# Patient Record
Sex: FEMALE | Race: WHITE
[De-identification: names, ages, dates, MRNs, and addresses within clinical notes are randomized per-mention and may not be internally consistent; named-entity substitution may affect disease eponyms.]

---

## 2021-06-29 ENCOUNTER — HOSPITAL ENCOUNTER (EMERGENCY)
Dept: HOSPITAL 19 - COL.ER | Age: 66
Discharge: HOME | End: 2021-06-29
Attending: FAMILY MEDICINE
Payer: MEDICARE

## 2021-06-29 VITALS — HEIGHT: 62.01 IN | WEIGHT: 217.38 LBS | BODY MASS INDEX: 39.5 KG/M2

## 2021-06-29 VITALS — HEART RATE: 72 BPM | DIASTOLIC BLOOD PRESSURE: 93 MMHG | SYSTOLIC BLOOD PRESSURE: 188 MMHG

## 2021-06-29 VITALS — TEMPERATURE: 98.8 F

## 2021-06-29 DIAGNOSIS — Z95.9: ICD-10-CM

## 2021-06-29 DIAGNOSIS — Z79.899: ICD-10-CM

## 2021-06-29 DIAGNOSIS — Z79.82: ICD-10-CM

## 2021-06-29 DIAGNOSIS — I10: ICD-10-CM

## 2021-06-29 DIAGNOSIS — I25.10: ICD-10-CM

## 2021-06-29 DIAGNOSIS — R06.02: Primary | ICD-10-CM

## 2021-06-29 LAB
ALBUMIN SERPL-MCNC: 4.3 GM/DL (ref 3.5–5)
ALP SERPL-CCNC: 46 U/L (ref 50–136)
ALT SERPL-CCNC: 47 U/L (ref 4–34)
ANION GAP SERPL CALC-SCNC: 7 MMOL/L (ref 7–16)
APTT PPP: 21.5 SECONDS (ref 26–37)
AST SERPL-CCNC: 49 U/L (ref 15–37)
BASOPHILS # BLD: 0 10*3/UL (ref 0–0.2)
BASOPHILS NFR BLD AUTO: 0.4 % (ref 0–2)
BILIRUB SERPL-MCNC: 0.5 MG/DL (ref 0–1)
BUN SERPL-MCNC: 8 MG/DL (ref 7–17)
CALCIUM SERPL-MCNC: 9.9 MG/DL (ref 8.4–10.2)
CHLORIDE SERPL-SCNC: 101 MMOL/L (ref 98–107)
CO2 SERPL-SCNC: 28 MMOL/L (ref 22–30)
CREAT SERPL-SCNC: 0.5 UMOL/L (ref 0.52–1.25)
EOSINOPHIL # BLD: 0.1 10*3/UL (ref 0–0.7)
EOSINOPHIL NFR BLD: 2.2 % (ref 0–4)
ERYTHROCYTE [DISTWIDTH] IN BLOOD BY AUTOMATED COUNT: 13.2 % (ref 11.5–14.5)
GLUCOSE SERPL-MCNC: 152 MG/DL (ref 74–106)
GRANULOCYTES # BLD AUTO: 65.4 % (ref 42.2–75.2)
HCT VFR BLD AUTO: 39.7 % (ref 37–47)
HGB BLD-MCNC: 13.1 G/DL (ref 12.5–16)
INR BLD: 1.1 (ref 0.8–3)
LYMPHOCYTES # BLD: 1.4 10*3/UL (ref 1.2–3.4)
LYMPHOCYTES NFR BLD: 24.6 % (ref 20–51)
MCH RBC QN AUTO: 29 PG (ref 27–31)
MCHC RBC AUTO-ENTMCNC: 33 G/DL (ref 33–37)
MCV RBC AUTO: 88 FL (ref 80–100)
MONOCYTES # BLD: 0.4 10*3/UL (ref 0.1–0.6)
MONOCYTES NFR BLD AUTO: 6.9 % (ref 1.7–9.3)
NEUTROPHILS # BLD: 3.6 10*3/UL (ref 1.4–6.5)
PLATELET # BLD AUTO: 221 K/MM3 (ref 130–400)
PMV BLD AUTO: 9.1 FL (ref 7.4–10.4)
POTASSIUM SERPL-SCNC: 4.2 MMOL/L (ref 3.4–5)
PROT SERPL-MCNC: 7.6 GM/DL (ref 6.4–8.2)
PROTHROMBIN TIME: 11.7 SECONDS (ref 9.7–12.8)
RBC # BLD AUTO: 4.53 M/MM3 (ref 4.1–5.3)
SODIUM SERPL-SCNC: 136 MMOL/L (ref 137–145)
TROPONIN I SERPL-MCNC: < 0.012 NG/ML (ref 0–0.04)

## 2021-07-21 ENCOUNTER — HOSPITAL ENCOUNTER (OUTPATIENT)
Dept: HOSPITAL 19 - COL.CAR | Age: 66
Discharge: HOME | End: 2021-07-21
Attending: INTERNAL MEDICINE
Payer: MEDICARE

## 2021-07-21 VITALS — HEART RATE: 68 BPM | DIASTOLIC BLOOD PRESSURE: 74 MMHG | SYSTOLIC BLOOD PRESSURE: 123 MMHG

## 2021-07-21 VITALS — HEART RATE: 75 BPM | SYSTOLIC BLOOD PRESSURE: 111 MMHG | DIASTOLIC BLOOD PRESSURE: 62 MMHG

## 2021-07-21 VITALS — HEART RATE: 75 BPM | DIASTOLIC BLOOD PRESSURE: 70 MMHG | SYSTOLIC BLOOD PRESSURE: 136 MMHG | TEMPERATURE: 97.8 F

## 2021-07-21 VITALS — SYSTOLIC BLOOD PRESSURE: 108 MMHG | DIASTOLIC BLOOD PRESSURE: 68 MMHG | HEART RATE: 77 BPM

## 2021-07-21 VITALS — DIASTOLIC BLOOD PRESSURE: 63 MMHG | SYSTOLIC BLOOD PRESSURE: 121 MMHG | HEART RATE: 69 BPM

## 2021-07-21 VITALS — HEART RATE: 71 BPM | SYSTOLIC BLOOD PRESSURE: 112 MMHG | DIASTOLIC BLOOD PRESSURE: 73 MMHG

## 2021-07-21 VITALS — BODY MASS INDEX: 39.5 KG/M2 | HEIGHT: 62.01 IN | WEIGHT: 217.38 LBS

## 2021-07-21 VITALS — HEART RATE: 72 BPM | DIASTOLIC BLOOD PRESSURE: 80 MMHG | SYSTOLIC BLOOD PRESSURE: 147 MMHG

## 2021-07-21 VITALS — SYSTOLIC BLOOD PRESSURE: 122 MMHG | HEART RATE: 68 BPM | DIASTOLIC BLOOD PRESSURE: 71 MMHG

## 2021-07-21 VITALS — DIASTOLIC BLOOD PRESSURE: 70 MMHG | HEART RATE: 71 BPM | SYSTOLIC BLOOD PRESSURE: 119 MMHG

## 2021-07-21 VITALS — HEART RATE: 73 BPM | SYSTOLIC BLOOD PRESSURE: 120 MMHG | DIASTOLIC BLOOD PRESSURE: 82 MMHG

## 2021-07-21 DIAGNOSIS — Z79.82: ICD-10-CM

## 2021-07-21 DIAGNOSIS — Z79.899: ICD-10-CM

## 2021-07-21 DIAGNOSIS — I10: ICD-10-CM

## 2021-07-21 DIAGNOSIS — Z20.822: ICD-10-CM

## 2021-07-21 DIAGNOSIS — E78.5: ICD-10-CM

## 2021-07-21 DIAGNOSIS — I25.118: Primary | ICD-10-CM

## 2021-07-21 LAB
ANION GAP SERPL CALC-SCNC: 4 MMOL/L (ref 7–16)
APTT PPP: 27.6 SECONDS (ref 26–37)
BUN SERPL-MCNC: 13 MG/DL (ref 7–17)
CALCIUM SERPL-MCNC: 9.7 MG/DL (ref 8.4–10.2)
CHLORIDE SERPL-SCNC: 103 MMOL/L (ref 98–107)
CO2 SERPL-SCNC: 32 MMOL/L (ref 22–30)
CREAT SERPL-SCNC: 0.48 UMOL/L (ref 0.52–1.25)
ERYTHROCYTE [DISTWIDTH] IN BLOOD BY AUTOMATED COUNT: 12.7 % (ref 11.5–14.5)
GLUCOSE SERPL-MCNC: 215 MG/DL (ref 74–106)
HCT VFR BLD AUTO: 40.4 % (ref 37–47)
HGB BLD-MCNC: 13.4 G/DL (ref 12.5–16)
INR BLD: 1 (ref 0.8–3)
MCH RBC QN AUTO: 29 PG (ref 27–31)
MCHC RBC AUTO-ENTMCNC: 33 G/DL (ref 33–37)
MCV RBC AUTO: 86 FL (ref 80–100)
PLATELET # BLD AUTO: 227 K/MM3 (ref 130–400)
PMV BLD AUTO: 9.4 FL (ref 7.4–10.4)
POTASSIUM SERPL-SCNC: 4.1 MMOL/L (ref 3.4–5)
PROTHROMBIN TIME: 11.5 SECONDS (ref 9.7–12.8)
RBC # BLD AUTO: 4.68 M/MM3 (ref 4.1–5.3)
SODIUM SERPL-SCNC: 138 MMOL/L (ref 137–145)

## 2021-07-21 NOTE — NUR
Pt is back from cath lab.  bs report from Yen VALLEJO.  pt is awake and alert,
pwd, TR band in place to rt wrist, cms intact distal.  poc reviewed, lunch
ordered call light in reach.  friend at bs.

## 2021-07-21 NOTE — NUR
PT RECOVERED WITH NO PROBLEM. TR BAND HAS BEEN DEFLATED, SITE DRESSED WITH
BANDAID, FOLDED 2X2 AND COBAN. CMS INTACT DISTAL.  WE HAVE REVIEWED DC/FU
INSTRUCTIONS.  NO QUESTIONS AT TIME OF DEPARTURE.  IV DC'D WITH CATH INTACT,
DRESSING APPLIED.  PT HAS BEEN AMB WITH STEADY GAIT.  TO EXIT VIA WHEELCHAIR.

## 2021-07-21 NOTE — NUR
SEE MERGE DOCUMENTATION FOR MEDICATION ADMINISTRATION AND INTRA/POST PROCEDURE
SEDATION ASSESSMENTS.

## 2022-08-07 ENCOUNTER — HOSPITAL ENCOUNTER (INPATIENT)
Dept: HOSPITAL 19 - COL.ER | Age: 67
LOS: 4 days | Discharge: NURSING, REHAB OR LONG-TERM CARE | DRG: 494 | End: 2022-08-11
Attending: INTERNAL MEDICINE | Admitting: INTERNAL MEDICINE
Payer: MEDICARE

## 2022-08-07 VITALS — BODY MASS INDEX: 38.62 KG/M2 | HEIGHT: 62.01 IN | WEIGHT: 212.53 LBS

## 2022-08-07 DIAGNOSIS — Z79.4: ICD-10-CM

## 2022-08-07 DIAGNOSIS — M85.80: ICD-10-CM
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
## 2022-08-07 DIAGNOSIS — Z88.5: ICD-10-CM

## 2022-08-07 DIAGNOSIS — I10: ICD-10-CM
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
## 2022-08-07 DIAGNOSIS — Z85.44: ICD-10-CM

## 2022-08-07 DIAGNOSIS — S82.402A: Primary | ICD-10-CM
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
## 2022-08-07 DIAGNOSIS — Z90.710: ICD-10-CM

## 2022-08-07 DIAGNOSIS — S82.401A: ICD-10-CM

## 2022-08-07 DIAGNOSIS — Z88.8: ICD-10-CM

## 2022-08-07 DIAGNOSIS — Z96.651: ICD-10-CM

## 2022-08-07 DIAGNOSIS — F32.A: ICD-10-CM

## 2022-08-07 DIAGNOSIS — R53.81: ICD-10-CM

## 2022-08-07 DIAGNOSIS — I25.10: ICD-10-CM

## 2022-08-07 DIAGNOSIS — Z79.82: ICD-10-CM

## 2022-08-07 DIAGNOSIS — E11.9: ICD-10-CM

## 2022-08-07 DIAGNOSIS — K21.9: ICD-10-CM

## 2022-08-07 DIAGNOSIS — Z95.5: ICD-10-CM

## 2022-08-07 DIAGNOSIS — Z72.89: ICD-10-CM

## 2022-08-07 DIAGNOSIS — E78.5: ICD-10-CM

## 2022-08-07 DIAGNOSIS — Z88.0: ICD-10-CM

## 2022-08-07 PROCEDURE — L4386 NON-PNEUM WALK BOOT PRE CST: HCPCS

## 2022-08-07 PROCEDURE — C1713 ANCHOR/SCREW BN/BN,TIS/BN: HCPCS

## 2022-08-08 VITALS — SYSTOLIC BLOOD PRESSURE: 119 MMHG | TEMPERATURE: 98.2 F | HEART RATE: 75 BPM | DIASTOLIC BLOOD PRESSURE: 57 MMHG

## 2022-08-08 VITALS — HEART RATE: 80 BPM | DIASTOLIC BLOOD PRESSURE: 75 MMHG | SYSTOLIC BLOOD PRESSURE: 135 MMHG | TEMPERATURE: 98.5 F

## 2022-08-08 VITALS — TEMPERATURE: 97.9 F | DIASTOLIC BLOOD PRESSURE: 74 MMHG | HEART RATE: 80 BPM | SYSTOLIC BLOOD PRESSURE: 147 MMHG

## 2022-08-08 VITALS — TEMPERATURE: 98.7 F | HEART RATE: 89 BPM | DIASTOLIC BLOOD PRESSURE: 61 MMHG | SYSTOLIC BLOOD PRESSURE: 129 MMHG

## 2022-08-08 VITALS — HEART RATE: 71 BPM | TEMPERATURE: 98.4 F | DIASTOLIC BLOOD PRESSURE: 53 MMHG | SYSTOLIC BLOOD PRESSURE: 126 MMHG

## 2022-08-08 VITALS — DIASTOLIC BLOOD PRESSURE: 55 MMHG | HEART RATE: 70 BPM | TEMPERATURE: 98.2 F | SYSTOLIC BLOOD PRESSURE: 134 MMHG

## 2022-08-08 LAB
ALBUMIN SERPL-MCNC: 4 GM/DL (ref 3.4–4.8)
ALP SERPL-CCNC: 39 U/L (ref 40–150)
ALT SERPL-CCNC: 37 U/L (ref 0–55)
ANION GAP SERPL CALC-SCNC: 16 MMOL/L (ref 7–16)
AST SERPL-CCNC: 30 U/L (ref 5–34)
BASOPHILS # BLD: 0.1 K/MM3 (ref 0–0.2)
BASOPHILS NFR BLD AUTO: 0.9 % (ref 0–2)
BILIRUB SERPL-MCNC: 0.5 MG/DL (ref 0.2–1.2)
BUN SERPL-MCNC: 7 MG/DL (ref 10–20)
CALCIUM SERPL-MCNC: 9.6 MG/DL (ref 8.4–10.2)
CHLORIDE SERPL-SCNC: 94 MMOL/L (ref 98–107)
CO2 SERPL-SCNC: 22 MMOL/L (ref 23–31)
CREAT SERPL-SCNC: 0.6 MG/DL (ref 0.57–1.11)
EOSINOPHIL # BLD: 0.2 K/MM3 (ref 0–0.7)
EOSINOPHIL NFR BLD: 2.8 % (ref 0–4)
ERYTHROCYTE [DISTWIDTH] IN BLOOD BY AUTOMATED COUNT: 12 % (ref 11.5–14.5)
ETHANOL SPEC-SCNC: 165 MG/DL (ref 0–10)
GLUCOSE SERPL-MCNC: 153 MG/DL (ref 70–99)
GRANULOCYTES # BLD AUTO: 61.7 % (ref 42.2–75.2)
HCT VFR BLD AUTO: 36.4 % (ref 37–47)
HGB BLD-MCNC: 12.8 G/DL (ref 12.5–16)
LYMPHOCYTES # BLD: 1.9 K/MM3 (ref 1.2–3.4)
LYMPHOCYTES NFR BLD: 27.9 % (ref 20–51)
MAGNESIUM SERPL-MCNC: 1.5 MG/DL (ref 1.6–2.6)
MCH RBC QN AUTO: 29 PG (ref 27–31)
MCHC RBC AUTO-ENTMCNC: 35 G/DL (ref 33–37)
MCV RBC AUTO: 84 FL (ref 80–100)
MONOCYTES # BLD: 0.4 K/MM3 (ref 0.1–0.6)
MONOCYTES NFR BLD AUTO: 6.1 % (ref 1.7–9.3)
NEUTROPHILS # BLD: 4.2 K/MM3 (ref 1.4–6.5)
PH UR STRIP.AUTO: 5 [PH] (ref 5–8)
PLATELET # BLD AUTO: 219 K/MM3 (ref 130–400)
PMV BLD AUTO: 9.1 FL (ref 7.4–10.4)
POTASSIUM SERPL-SCNC: 3.8 MMOL/L (ref 3.5–4.5)
PROT SERPL-MCNC: 7.4 GM/DL (ref 6.2–8.1)
RBC # BLD AUTO: 4.35 M/MM3 (ref 4.1–5.3)
RBC # UR: (no result) /HPF (ref 0–2)
SODIUM SERPL-SCNC: 132 MMOL/L (ref 136–145)
SP GR UR STRIP.AUTO: 1 (ref 1–1.03)
SQUAMOUS # URNS: (no result) /HPF (ref 0–10)
URN COLLECT METHOD CLASS: (no result)

## 2022-08-08 PROCEDURE — 2W3LX1Z IMMOBILIZATION OF RIGHT LOWER EXTREMITY USING SPLINT: ICD-10-PCS | Performed by: EMERGENCY MEDICINE

## 2022-08-08 PROCEDURE — 2W3MX1Z IMMOBILIZATION OF LEFT LOWER EXTREMITY USING SPLINT: ICD-10-PCS | Performed by: EMERGENCY MEDICINE

## 2022-08-08 NOTE — NUR
Initial visit; Patient has broken both ankles and talked about her "bad luck!"
She and  prayed and  offered comfort and will keep her in her
prayers.

## 2022-08-08 NOTE — NUR
PT A&O RESTING IN BED. PT REPORTING PAIN 4/10 IN BLE. NORCO GIVEN. NO INSULIN
NEEDED PER SS. ASSESSMENT COMPLETED. SPLINTS APPLIED TO BLE. CAMPOS IN PLACE.
IV WITH NS @75 IN RT HAND. NO OTHER NEEDS AT THIS TIME. CALL LIGHT WITHIN
REACH.

## 2022-08-08 NOTE — NUR
Pt has had an uneventful night. Pain has been managed with x1 dose of morphine
and x1 dose of norco. The patient does have considerable pain when she moves.
Per orders, the patient is NWB until after PT sees her. No other concerns.
Will report to Day shift RN.

## 2022-08-08 NOTE — NUR
BRENDA met with the patient to discuss to discuss discharge plan. The patient
lives alone in an apartment in [place]. She states that she lives on the
first floor and they are handicap accessible apartments. She reports
indepedence with ADLs and has a cane. The patient's PCP is Dr. Jim Mix
in  and she receives her medications from [facility]. The patient does not
have a DPOA-HC, but she was interested in obtaining a form. BRENDA provided. The
patient states that she is still  to her , Nila, but he is in
care home and does not want him involved. She has two daughters, but does not
talk to them. She provided SW with her sister's contact information: Ale
(ph#[phone]). She lives in [place]. SW informed her that her  would
be her legal next of kin, until the DPOA-HC is completed. The patient
verbalized understanding and states that she plans on getting the DPOA-HC done
while here. The patient has bilateral ankle fractures and is non-weight
bearing. BRENDA discussed post-acute rehab upon discharge. The patient is
interested in rehab. SW provided her with Medicare.gov's list of SNFs in the
[place] area and informed her or IPR. The patient chose IPR. She does
not have a second preference and is open to SW sending referrals to other
local facilities and [facility]. BRENDA consulted [facility] Director, Aarti. BRENDA
contacted and faxed a referral to [facility], [facility], and [facility]. Awaiting screens.
 
The patient provides that she has also been depressed with why her  is
in care home and that he will be getting out soon. She was interested in a list
of mental health resources. SW provided her with that list. The patient denies
suicideal ideation.
 
*Discharge plan: post-acute rehab*

## 2022-08-09 VITALS — DIASTOLIC BLOOD PRESSURE: 64 MMHG | SYSTOLIC BLOOD PRESSURE: 140 MMHG | HEART RATE: 73 BPM

## 2022-08-09 VITALS — DIASTOLIC BLOOD PRESSURE: 59 MMHG | SYSTOLIC BLOOD PRESSURE: 123 MMHG | HEART RATE: 72 BPM

## 2022-08-09 VITALS — HEART RATE: 71 BPM | TEMPERATURE: 98.3 F | DIASTOLIC BLOOD PRESSURE: 60 MMHG | SYSTOLIC BLOOD PRESSURE: 123 MMHG

## 2022-08-09 VITALS — SYSTOLIC BLOOD PRESSURE: 123 MMHG | TEMPERATURE: 97.7 F | DIASTOLIC BLOOD PRESSURE: 82 MMHG | HEART RATE: 69 BPM

## 2022-08-09 VITALS — DIASTOLIC BLOOD PRESSURE: 54 MMHG | HEART RATE: 74 BPM | SYSTOLIC BLOOD PRESSURE: 127 MMHG | TEMPERATURE: 98.3 F

## 2022-08-09 VITALS — HEART RATE: 62 BPM | TEMPERATURE: 97.6 F | SYSTOLIC BLOOD PRESSURE: 109 MMHG | DIASTOLIC BLOOD PRESSURE: 58 MMHG

## 2022-08-09 VITALS — SYSTOLIC BLOOD PRESSURE: 139 MMHG | DIASTOLIC BLOOD PRESSURE: 69 MMHG | HEART RATE: 72 BPM

## 2022-08-09 VITALS — HEART RATE: 72 BPM | DIASTOLIC BLOOD PRESSURE: 73 MMHG | SYSTOLIC BLOOD PRESSURE: 124 MMHG

## 2022-08-09 VITALS — SYSTOLIC BLOOD PRESSURE: 124 MMHG | HEART RATE: 68 BPM | DIASTOLIC BLOOD PRESSURE: 88 MMHG | TEMPERATURE: 98 F

## 2022-08-09 VITALS — HEART RATE: 66 BPM | DIASTOLIC BLOOD PRESSURE: 75 MMHG | SYSTOLIC BLOOD PRESSURE: 158 MMHG

## 2022-08-09 VITALS — SYSTOLIC BLOOD PRESSURE: 136 MMHG | DIASTOLIC BLOOD PRESSURE: 60 MMHG | HEART RATE: 70 BPM

## 2022-08-09 VITALS — HEART RATE: 78 BPM | SYSTOLIC BLOOD PRESSURE: 129 MMHG | DIASTOLIC BLOOD PRESSURE: 54 MMHG

## 2022-08-09 VITALS — TEMPERATURE: 97.7 F | DIASTOLIC BLOOD PRESSURE: 48 MMHG | HEART RATE: 67 BPM | SYSTOLIC BLOOD PRESSURE: 142 MMHG

## 2022-08-09 LAB
ANION GAP SERPL CALC-SCNC: 12 MMOL/L (ref 7–16)
BASOPHILS # BLD: 0 K/MM3 (ref 0–0.2)
BASOPHILS NFR BLD AUTO: 0.6 % (ref 0–2)
BUN SERPL-MCNC: 7 MG/DL (ref 10–20)
CALCIUM SERPL-MCNC: 9.5 MG/DL (ref 8.4–10.2)
CHLORIDE SERPL-SCNC: 101 MMOL/L (ref 98–107)
CO2 SERPL-SCNC: 27 MMOL/L (ref 23–31)
CREAT SERPL-SCNC: 0.63 MG/DL (ref 0.57–1.11)
EOSINOPHIL # BLD: 0.2 K/MM3 (ref 0–0.7)
EOSINOPHIL NFR BLD: 3.9 % (ref 0–4)
ERYTHROCYTE [DISTWIDTH] IN BLOOD BY AUTOMATED COUNT: 12.3 % (ref 11.5–14.5)
GLUCOSE SERPL-MCNC: 142 MG/DL (ref 70–99)
GRANULOCYTES # BLD AUTO: 55.9 % (ref 42.2–75.2)
HCT VFR BLD AUTO: 37.8 % (ref 37–47)
HGB BLD-MCNC: 12.6 G/DL (ref 12.5–16)
LYMPHOCYTES # BLD: 1.5 K/MM3 (ref 1.2–3.4)
LYMPHOCYTES NFR BLD: 31 % (ref 20–51)
MCH RBC QN AUTO: 29 PG (ref 27–31)
MCHC RBC AUTO-ENTMCNC: 33 G/DL (ref 33–37)
MCV RBC AUTO: 87 FL (ref 80–100)
MONOCYTES # BLD: 0.4 K/MM3 (ref 0.1–0.6)
MONOCYTES NFR BLD AUTO: 7.8 % (ref 1.7–9.3)
NEUTROPHILS # BLD: 2.7 K/MM3 (ref 1.4–6.5)
PLATELET # BLD AUTO: 215 K/MM3 (ref 130–400)
PMV BLD AUTO: 9.7 FL (ref 7.4–10.4)
POTASSIUM SERPL-SCNC: 4 MMOL/L (ref 3.5–4.5)
RBC # BLD AUTO: 4.36 M/MM3 (ref 4.1–5.3)
SODIUM SERPL-SCNC: 140 MMOL/L (ref 136–145)

## 2022-08-09 PROCEDURE — 0QSK04Z REPOSITION LEFT FIBULA WITH INTERNAL FIXATION DEVICE, OPEN APPROACH: ICD-10-PCS | Performed by: ORTHOPAEDIC SURGERY

## 2022-08-09 NOTE — NUR
PATIENT IS A&O. VSS. C/O PAIN IN BLE RATED AT 6/10, GAVE PRN NORCO PER ORDERS.
BLE ELEVATED WITH PILLOWS. SPLINT/CAST DRESSING IS CD&I. NWB TO BLE. PT/OT
CONSULTED. AWAITING ORTHO TO ROUND. NO C/O N/V. BREAKFAST TRAY AT BEDSIDE.
AM BS 'S, NO SSI REQUIRED. AM MEDS GIVEN. RIGHT HAND IV TO INT. CAMPOS
TO DD WITH MOD AMOUNTS OF CLEAR YELLOW URINE NOTED. HEAD TO TOE ASSESSMENT
COMPLETE. PATIENT LIVES ALONE AS HER  IS IN [facility] AS REPORTED BY HER.
PATIENT WILL LIKELY NEED PLACEMENT UPON DISCHARGE.

## 2022-08-09 NOTE — NUR
Follow-up; Maribell enjoying sitting up this morning and visited and 'joked
around' with . Patient thanked  for coming in, making her day
lighter and offering God's blessings.

## 2022-08-09 NOTE — NUR
Ortho was consulted. Ortho plans to take the patient to surgery today. Aarti,
[facility] Director, reports that they are following the patient. SW to fax updates
to [facility], [facility], and [facility].

## 2022-08-09 NOTE — NUR
PATIENT GOING DOWN TO OR EARLY FOR A BLOCK. CONSENT ON CHART. IV FLUIDS
INFUSING VIA GRAVITY. PATIENT OFF FLOOR.

## 2022-08-09 NOTE — NUR
PT A&O RESTING IN BED WATCHING TV. PT TOLERATED DINNER. ASSESSMENT COMPLETED.
PT RATES PN A 0/10. CAM WALKERS APPLIED TO BLE. IV FLUIDS TO LH. CAMPOS IN
PLACE. NO OTHER NEEDS AT THIS TIME. CALL LIGHT WITHIN REACH.

## 2022-08-09 NOTE — NUR
PATIENT BACK IN ROOM POST OP. A&O. NO C/O PAIN. UNABLE TO MOVE BLE DUE TO
BLOCK. VSS. HEAD TO TOE ASSESSMENT WNL. PATIENT ASKING ABOUT EATTING AND IS
NOW LOOKING AT MENU. BS IN PACU . ADA DIET.

## 2022-08-09 NOTE — NUR
ATTEMPTED TO CALL ORTHO CONSULT AS PATIENT WAS NEARLY DISCHARGED FROM ER WITH
AN ORTHO FLEX APT @ 1000 ACCORDING TO ER DOCUMENTATION. SINCE ADMISSION TO
FLOOR, NO ORTHO NOTIFICATION FOR CONSULTED COMPLETED. NEITHER ORTHO GROUP WAS
AWARE OF PATIENT IN ER OR ADMISSION STATUS.  WILL CONSULT FOR THIS
PATIENT, SEE ORDERS.

## 2022-08-09 NOTE — NUR
ORTHO MIDLEVEL ROUNDING, PATIENT WILL REQUIRE SURGICAL INTERVENTION. PATIENT
ALREADY AT BREAKFAST AND IS NOW NPO. PT HAD ALSO BE CONSULTED ON ADMISSION AND
GOT PATIENT UP TO WC WITH SLIDE BOARD, PER ORDERS, WITH NWB STATUS TO BLE.
PATIENT IS NOW BACK IN BED ON BEDREST TILL SURGERY. SEE ORDERS FOR CONSENT.
ANESTHESIA AWARE. PLAN IS FOR OR AROUND 1600.

## 2022-08-10 VITALS — SYSTOLIC BLOOD PRESSURE: 146 MMHG | DIASTOLIC BLOOD PRESSURE: 71 MMHG | HEART RATE: 79 BPM | TEMPERATURE: 98.1 F

## 2022-08-10 VITALS — TEMPERATURE: 98.4 F | SYSTOLIC BLOOD PRESSURE: 148 MMHG | DIASTOLIC BLOOD PRESSURE: 64 MMHG | HEART RATE: 72 BPM

## 2022-08-10 VITALS — TEMPERATURE: 97.8 F | SYSTOLIC BLOOD PRESSURE: 148 MMHG | DIASTOLIC BLOOD PRESSURE: 59 MMHG | HEART RATE: 81 BPM

## 2022-08-10 VITALS — TEMPERATURE: 98.6 F | DIASTOLIC BLOOD PRESSURE: 89 MMHG | HEART RATE: 79 BPM | SYSTOLIC BLOOD PRESSURE: 131 MMHG

## 2022-08-10 VITALS — DIASTOLIC BLOOD PRESSURE: 84 MMHG | TEMPERATURE: 98.7 F | SYSTOLIC BLOOD PRESSURE: 162 MMHG

## 2022-08-10 VITALS — DIASTOLIC BLOOD PRESSURE: 64 MMHG | TEMPERATURE: 97.4 F | SYSTOLIC BLOOD PRESSURE: 153 MMHG | HEART RATE: 70 BPM

## 2022-08-10 LAB
ANION GAP SERPL CALC-SCNC: 11 MMOL/L (ref 7–16)
BASOPHILS # BLD: 0 K/MM3 (ref 0–0.2)
BASOPHILS NFR BLD AUTO: 0.5 % (ref 0–2)
BUN SERPL-MCNC: 10 MG/DL (ref 10–20)
CALCIUM SERPL-MCNC: 9.6 MG/DL (ref 8.4–10.2)
CHLORIDE SERPL-SCNC: 98 MMOL/L (ref 98–107)
CO2 SERPL-SCNC: 28 MMOL/L (ref 23–31)
CREAT SERPL-SCNC: 0.65 MG/DL (ref 0.57–1.11)
EOSINOPHIL # BLD: 0.2 K/MM3 (ref 0–0.7)
EOSINOPHIL NFR BLD: 4.2 % (ref 0–4)
ERYTHROCYTE [DISTWIDTH] IN BLOOD BY AUTOMATED COUNT: 12.5 % (ref 11.5–14.5)
GLUCOSE SERPL-MCNC: 128 MG/DL (ref 70–99)
GRANULOCYTES # BLD AUTO: 62.6 % (ref 42.2–75.2)
HCT VFR BLD AUTO: 36.2 % (ref 37–47)
HGB BLD-MCNC: 11.6 G/DL (ref 12.5–16)
LYMPHOCYTES # BLD: 1.4 K/MM3 (ref 1.2–3.4)
LYMPHOCYTES NFR BLD: 24.7 % (ref 20–51)
MCH RBC QN AUTO: 29 PG (ref 27–31)
MCHC RBC AUTO-ENTMCNC: 32 G/DL (ref 33–37)
MCV RBC AUTO: 90 FL (ref 80–100)
MONOCYTES # BLD: 0.4 K/MM3 (ref 0.1–0.6)
MONOCYTES NFR BLD AUTO: 7.5 % (ref 1.7–9.3)
NEUTROPHILS # BLD: 3.6 K/MM3 (ref 1.4–6.5)
PLATELET # BLD AUTO: 197 K/MM3 (ref 130–400)
PMV BLD AUTO: 9.5 FL (ref 7.4–10.4)
POTASSIUM SERPL-SCNC: 3.6 MMOL/L (ref 3.5–4.5)
RBC # BLD AUTO: 4.03 M/MM3 (ref 4.1–5.3)
SODIUM SERPL-SCNC: 137 MMOL/L (ref 136–145)

## 2022-08-10 NOTE — NUR
PATIENT UP TO BEDSIDE COMMODE WITH PIVOTE TRANSFER WITH PT. PATIENT DID HAVE
BM. PATIENT THEN TRANSFERED INTO BEDSIDE CHAIR. PATIENT BECAME TEARFUL AT
DIFFICULTY IN JUST PIVOTING BUT REPORTS NO ISSUES WITH PAIN. PATIENT ALSO
EXPRESSES DIFFICULTY WITH MAINTAINING TTWB TO LLE. CAM BOOTS TO BLE. SEE PT
NOTES.

## 2022-08-10 NOTE — NUR
PATIENT IS A&O. VSS. DENIES PAIN IN BLE SINCE GETTING BLOCK YESTERDAY IN OR.
PAIN WELL MANAGED. CAM BOOTS INPLACE TO BLE. TTWB TO LLE. WBAT TO RLE. PT/OT
CONSULTED. PATIENT REPORTS SHE WILL NEED PLACEMENT AS SHE CURRENTLY LIVES
ALONE BECAUSE HER  IS IN intermediate. NO C/O N/V. LEFT FORARM IV TO INT. AM
BS . BREAKFAST TRAY AT BEDSIDE. AM MEDS GIVEN. HEAD TO TOE ASSESSMENT
COMPLETE. CAMPOS TO DD WITH MOD AMOUNTS OF URINE NOTED. NO OTHER NEEDS AT THIS
TIME. CALL LIGHT IN REACH.

## 2022-08-10 NOTE — NUR
PT A&OX4 RESTING IN BED. ASSESSMENT COMPLETED. NO INSULIN REQUIRED PER SS. PT
RATES PN IN RLE A 4/10 AND NO PN IN THE LLE. CAM BOOTS TO BOTH EXTREMITIES. LH
INT PATENT. NO OTHER NEEDS AT THIS TIME. CALL LIGHT WITHIN REACH.

## 2022-08-11 ENCOUNTER — HOSPITAL ENCOUNTER (INPATIENT)
Dept: HOSPITAL 19 - IPR | Age: 67
LOS: 8 days | Discharge: HOME HEALTH | DRG: 561 | End: 2022-08-19
Attending: PHYSICAL MEDICINE & REHABILITATION | Admitting: PHYSICAL MEDICINE & REHABILITATION
Payer: MEDICARE

## 2022-08-11 VITALS — TEMPERATURE: 98.3 F | HEART RATE: 71 BPM | DIASTOLIC BLOOD PRESSURE: 54 MMHG | SYSTOLIC BLOOD PRESSURE: 116 MMHG

## 2022-08-11 VITALS — DIASTOLIC BLOOD PRESSURE: 64 MMHG | TEMPERATURE: 98 F | SYSTOLIC BLOOD PRESSURE: 148 MMHG | HEART RATE: 78 BPM

## 2022-08-11 VITALS — HEIGHT: 62.01 IN | BODY MASS INDEX: 51.99 KG/M2 | WEIGHT: 286.16 LBS

## 2022-08-11 VITALS — DIASTOLIC BLOOD PRESSURE: 76 MMHG | SYSTOLIC BLOOD PRESSURE: 167 MMHG | TEMPERATURE: 98.4 F | HEART RATE: 78 BPM

## 2022-08-11 VITALS — DIASTOLIC BLOOD PRESSURE: 55 MMHG | TEMPERATURE: 98 F | HEART RATE: 73 BPM | SYSTOLIC BLOOD PRESSURE: 126 MMHG

## 2022-08-11 VITALS — TEMPERATURE: 97.9 F | SYSTOLIC BLOOD PRESSURE: 135 MMHG | HEART RATE: 71 BPM | DIASTOLIC BLOOD PRESSURE: 61 MMHG

## 2022-08-11 DIAGNOSIS — Z88.5: ICD-10-CM

## 2022-08-11 DIAGNOSIS — R26.89: ICD-10-CM

## 2022-08-11 DIAGNOSIS — Z95.5: ICD-10-CM

## 2022-08-11 DIAGNOSIS — F10.20: ICD-10-CM

## 2022-08-11 DIAGNOSIS — Z79.891: ICD-10-CM

## 2022-08-11 DIAGNOSIS — S89.301D: ICD-10-CM

## 2022-08-11 DIAGNOSIS — Z73.6: ICD-10-CM

## 2022-08-11 DIAGNOSIS — K21.9: ICD-10-CM

## 2022-08-11 DIAGNOSIS — Z96.651: ICD-10-CM

## 2022-08-11 DIAGNOSIS — Z79.82: ICD-10-CM

## 2022-08-11 DIAGNOSIS — S82.62XD: Primary | ICD-10-CM

## 2022-08-11 DIAGNOSIS — Z88.8: ICD-10-CM

## 2022-08-11 DIAGNOSIS — E78.5: ICD-10-CM

## 2022-08-11 DIAGNOSIS — F32.A: ICD-10-CM

## 2022-08-11 DIAGNOSIS — Z88.0: ICD-10-CM

## 2022-08-11 DIAGNOSIS — I10: ICD-10-CM

## 2022-08-11 DIAGNOSIS — Z79.899: ICD-10-CM

## 2022-08-11 DIAGNOSIS — I25.10: ICD-10-CM
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
## 2022-08-11 NOTE — NUR
ASSISTED PIVOT TO BSC PER LT TTWB/ WALKER. VOIDED W/O DIFFICULTY. PT C/O
SEVERE BILAT ANKLE PAIN.  SEE MAR FOR NORCO GIVEN. INT TO LT HAND DC'D. READY
FOR BED. CALL LIGHT IN REACH. BED ALARM SET.

## 2022-08-11 NOTE — NUR
CALLED DR UGO LOUIS. SHE STATED FROM [facility] STANDPOINT PATIENT IS TO FOLLOW UP
WITH THEM IN 1 WEEK. WBAT TO R LEG, AND TTWB TO LEFT LEG. CAMBOOTS TO STAY ON
UNLESS SHORT PERIOD OF TIME FOR ASSESSEMENT. TRHEY ARE COMFORTABLE WITH
PATIENT GOING TO IPR IF REQUESTED. AND WILL FOLLOW UP AGAIN IF NEEDED. WILL
INFORM HOSPITALIST GROUP.

## 2022-08-11 NOTE — NUR
ASSESSMENT DONE AS ORDER. PATIENT ARRIVED  PM IN ROOM 340. PATIENT ALERT
AND ORIENT X 4 WITH NO  MEMORY ISSUE AT THIS TIME. PAIN IS MILD AT THIS
TIME 4/10 ON PAIN SCALE. LUNG SOUND CLERA IN ALL LOBES. BOWEL SOUND ACTIVE IN
ALL 4 QUADS. BOWEL MOVEMENT DURING ROOM CHANGE (LOOSE STOOL) PATIENT ABLE TO
STAND AND PELIV TO COMMOND AND BACK TO BED. LEFT FOOT SURGERY. RIGHT FOOT NO
FRACTURE NOTED. BOTH OF THEM HAVE A BOOT ON. LEFT FOOT HAS STABLES AND IS WRAP
UP WITH KEROFORM AND GAUZE WITH ACE WRAP. CALL LIGHT  IN REACH, BED ALARM ON
AS ORDER.

## 2022-08-11 NOTE — NUR
Aarti, [facility] Director, reports that they are able to accept the patient today.
The patient is to discharge today, 8/11, to [facility]. No
additional needs at this time.

## 2022-08-12 VITALS — HEART RATE: 70 BPM | SYSTOLIC BLOOD PRESSURE: 146 MMHG | DIASTOLIC BLOOD PRESSURE: 65 MMHG | TEMPERATURE: 98.3 F

## 2022-08-12 VITALS — TEMPERATURE: 97.7 F | HEART RATE: 67 BPM | DIASTOLIC BLOOD PRESSURE: 56 MMHG | SYSTOLIC BLOOD PRESSURE: 139 MMHG

## 2022-08-12 NOTE — NUR
JANICE GUTIERREZ CAME BY AND SPOKE WITH PATIENT REGARDING DOPA. PAPERWORK WAS SIGN
BY PATIENT,NURSE AND SW. PATIENT REQUESTING FOR HER FRIEND TO BE THE DPOA.
PATIENT HAD PAIN DUE TO WORKING ALOT ON HER RIGHT FOOT. EXPLAIN TO SLOW DOWN A
LITTLE AND REST INBETWEEN SESSION. PATIENT UNDERSTOOD.

## 2022-08-12 NOTE — NUR
ASSESSMENT DONE AS ORDER. PATIENT ALERT X 4 WITH NO MEMORY ISSUE. PATIENT CAME
FROM SURGICAL UNIT YESTERDAY. PATIENT WAS PIVOT TURN TRANSFER DUE TO FRACTURE
ON LEFT FOOT AND BRUISE ON RIGHT FOOT.BILAT CAM BOOTS ON AS ORDER. GORGECO WAS
GIVEN THIS AM FOR PAIN DUE TO MOVING AROUND. LUNG SOUND CLEAR IN ALL LOBES.
BOWEL SOUND ACTIVE IN ALL 4 QUADS. PATIENT SLIGHTLY WEAK AND OT IS WORKING ON
UPPER ARM. PT WORKING ON LOWER PART OF BODY AND TRYING TO GET HER RIGHT LEG
STRONGER. SPOKE WITH DR VILLANUEVA ABOUT LIPITOR AND SSI. HE SAID HE WILL
DISCONTINUE LIPITOR AND D/C ACCUCHECK DUE TO PATIENT NOT TAKING ANY MEDICATION
AND CONTROLLY IT WITH DIET AND EXCERISE. CALL LIGHT IN REACH.

## 2022-08-12 NOTE — NUR
met with patient to complete initial intake as she is new to
[facility]. Patient lives alone in [place] with her cat, Meryl in a one level
apartment. Patient currently sees Dr. Jim Mix for primary care, but
plans to change this at some point.  discussed DPOA-HC wiht
patient as her legal next of kin, her  is in [facility] and patient also
does not have good communication with her children. Patient would like to
complete DPOA-HC form with SW and she stated she wanted to desigate her
friend, Estrella (ph#[phone]) as primary agent and sister, Ale
(ph#[phone]). SW assisted patient in completing form then SW and RN
provided witness signature. SW placed form in chart and provided copy to
patient. Patient had no questions or concerns at this time.

## 2022-08-13 VITALS — SYSTOLIC BLOOD PRESSURE: 151 MMHG | TEMPERATURE: 98.1 F | HEART RATE: 80 BPM | DIASTOLIC BLOOD PRESSURE: 58 MMHG

## 2022-08-13 VITALS — TEMPERATURE: 97.7 F | HEART RATE: 70 BPM | DIASTOLIC BLOOD PRESSURE: 65 MMHG | SYSTOLIC BLOOD PRESSURE: 148 MMHG

## 2022-08-13 NOTE — NUR
PATIENT REQUESTED/GIVEN PAIN MED, SEE MAR FOR MED GIVEN. UP TO BSC TO VOID AND
BACK TO BED WITH NO ADDITIONAL COMPLAINTS/NEEDS REPORTED AT THIS TIME.

## 2022-08-13 NOTE — NUR
ASSESSMENT DONE AS ORDER. PATIENT ALERT X 4 WITH NO MEMORY ISSUE. PATIENT PAIN
WAS SLIGHTLY HIGH THIS AM AS SHE ASK FOR A PAIN MEDICATION. NORCO WAS GIVEN
AS ORDER. PAIN WAS A 6/10 BEFORE MEDICATION. LUNG SOUND CLEAR IN ALL LOBES.
BOWEL SOUND ACTIVE IN ALL 4 QUADS. LEGS IN BOOT AS ORDER. NO DRAIN NOTED.
PATINET ABLE TO PIVOT AND USE COMMODE.

## 2022-08-13 NOTE — NUR
NORCO AND TYLENOL WAS GIVEN ABOUT 4 HOURS APART. PATIENT STATED HER PAIN IS
4/10 AT THIS TIME BUT AT 11AM, HER PAIN WAS A 2/10. EXPLAIN TO REST AND WE
WILL REASSESS THE PAIN. PATIENT UNDERSTOOD

## 2022-08-13 NOTE — NUR
PATIENT HAD A LARGE LOOSE BOWEL MOVEMENT. PATIENT HAD 4 TYLENOL TODAY AND 1
DOSE OF NORCO FOR PAIN. PAIN HAS BEEN AROUND 2/10-4/10.

## 2022-08-14 VITALS — HEART RATE: 69 BPM | TEMPERATURE: 98 F | DIASTOLIC BLOOD PRESSURE: 59 MMHG | SYSTOLIC BLOOD PRESSURE: 140 MMHG

## 2022-08-14 VITALS — DIASTOLIC BLOOD PRESSURE: 56 MMHG | SYSTOLIC BLOOD PRESSURE: 149 MMHG | HEART RATE: 63 BPM | TEMPERATURE: 97.8 F

## 2022-08-14 NOTE — NUR
PATIENT CURRENTLY RESTING IN BED. SHE HAD A LARGE LOOSE BOWL MOVEMENT. PAIN
MEDICINE GIVEN. CALL LIGHT AND BELNIMARLOGS WITHIN REACH.

## 2022-08-14 NOTE — NUR
ASSISTED PATIENT TO BEDSIDE COMMODE, X1 ASSIST. PATIENT HAD UNMEASURED CLEAR
YELLOW VOID. ASSISTED PATIENT BACK TO BED. PATIENT REQUESTED PAIN MEDICATION.
NORCO GIVEN LAST. DISCUSSED WITH ALEXANDRIA VALLEJO, PRN TYLENOL GIVEN. DENIES
ADDITIONAL NEEDS.

## 2022-08-15 VITALS — DIASTOLIC BLOOD PRESSURE: 59 MMHG | TEMPERATURE: 97.6 F | SYSTOLIC BLOOD PRESSURE: 134 MMHG | HEART RATE: 70 BPM

## 2022-08-15 VITALS — SYSTOLIC BLOOD PRESSURE: 132 MMHG | DIASTOLIC BLOOD PRESSURE: 72 MMHG | TEMPERATURE: 97.4 F | HEART RATE: 67 BPM

## 2022-08-15 NOTE — NUR
PATIENT HAD SHOWER TODAY AND PATIENT INFORM OT THAT SHE HAS BEEN HAVING PAIN
ON RIGHT ANKLE. NURSE ASSESS AND NOTICES THERE IS REDNESS ON ANKLE AND BELOW
AND ON TOP. SWOLLEN WITH EDEMA NOTED(+2 PEDDING EDEMA) PAIN IS A 2/10 AT THIS
TIME WITH OUT THE BOOT. PATIENT INFORM THAT WHEN SHE STAND SHE FEELS THAT THE
BOOT IS RUBBING AGAINS THE ANKLE. I PLACE ICE AROUND AREA TO HELP WITH THE
EDEMA. ELEVATED LEG AT THIS TIME. EXPLAIN THAT WE WILL WRAP AREA WITH ACE WRAP
TODAY TO HELP CUSHION UP BEFORE PLACING THE BOOT BACK ON. PATIENT UNDERSTOOD.

## 2022-08-15 NOTE — NUR
ASSESSMENT DONE AS ORDER. PATIENT ALERT X 4 WITH NO MEMORY ISSUE. PATIENT
ASK FOR A NORCO AT 8:00 THIS MORNING DUE TO PAIN ON RIGHT ANKLE 4/10. PATIENT
STATED THAT HER PAIN HAS INCREASE BUT WITH NORCO ON BOARD IT DECREASE TO A
2/10 ON PAIN SCALE. LUNG SOUND CLEAR IN ALL LOBES. BOWEL SOUND ACTIVE IN ALL 4
QUADS. BOWEL MOVEMENT DAY WITH NO MIRALEX NEEDED. LOOSE STOOL.CALL LIGHT IN
REACH.

## 2022-08-15 NOTE — NUR
WEIGHT WAS DONE TODAY. DURING ADMISSION PATIENT WAS WEIGHT IN BED. TODAY
PATINET WAS WEIGHT ON SCALE WITH WHEELCHAIR. TODAY WEIGHT IS 99.5

## 2022-08-15 NOTE — NUR
PATIENT RESTING IN BED EATHING HIS DINNER AT THIS TIME.  PATIENT REQUESTS
FRESH ICE WATER AT THIS TIME.  PATIENT EXPRESSED APPRECIATION FOR THE FRESH
ICE WATER AND DENIES FURTHER NEEDS OR CONCERNS.  PATIENT IS ENCOURAGED TO CALL
WITH ANY NEEDS OR CONCERNS.  CALL LIGHT IS WITHIN REACH OF PATIENT.

## 2022-08-15 NOTE — NUR
Admission QIM scores were reviewed by the team.  Code of 5 chosen for oral
hygiene was determined by team discussion to be the most usual performance
before interventions for this patient during the assessment period.  Code of 3
chosen for toilet hygiene was determined by team discussion to be the most
usual performance before interventions for this patient during the assessment
period.  Code of 3 chosen for toilet transfer was determined by team
discussion to be the most usual performance before interventions for this
patient during the assessment period.  Code of 6 chosen for lying to sitting
on side of bed was determined by team discussion to be the most usual
performance before interventions for this patient during the assessment
period.   Code of 3 chosen for sit to stand was determined by team discussion
to be the most usual performance before interventions for this patient during
the assessment period.  Code of 3 chosen for chair/bed to chair transfer was
determined by team discussion to be the most usual performance before
interventions for this patient during the assessment period.--Aarti King, PD

## 2022-08-15 NOTE — NUR
checked in with patient who had questions about DME and Home
Health. SW advised that the therapy team gets together on Wednesdays and SW
will follow up with recommendations.

## 2022-08-16 VITALS — SYSTOLIC BLOOD PRESSURE: 130 MMHG | DIASTOLIC BLOOD PRESSURE: 64 MMHG | TEMPERATURE: 97.6 F | HEART RATE: 74 BPM

## 2022-08-16 VITALS — HEART RATE: 73 BPM | TEMPERATURE: 98 F | DIASTOLIC BLOOD PRESSURE: 43 MMHG | SYSTOLIC BLOOD PRESSURE: 126 MMHG

## 2022-08-16 NOTE — NUR
Pt reporting area of soreness to right lat. ankle. Skin assessed - no open
area/abrasion noted. Bruising and swelling remain from injury. Allevyn foam
dressing applied over tender area for comfort. Will continue to monitor.

## 2022-08-16 NOTE — NUR
PATIENT UP TO BEDSIDE COMMODE AND REQUESTS PAIN MEDICATION AS WELL AS PROTONIX
AND LOVENOX.  PATIENT STATES THAT WAY SHE WILL BE ABLE TO HOPEFULLY GET MORE
SLEEP THIS WAY.  PATIENT PIVOT TRANSFERS TO COMMODE WITH WALKER AND GAIT BELT
AND ASSIST OF 1.  PATIENT EXPRESSES APPRECIATION CARE WAS CLUSTERED FOR HER
CONVENIENCE.  PATIENT PIVOT TRANSFERS BACK TO BED WITH ASSIST OF 1, WALKER &
GAIT BELT AT THIS TIME.  PATIENT DENIES FURTHER NEEDS AND/OR CONCERNS AT THIS
TIME.  CALL LIGHT IS WITHIN PATIENT REACH.

## 2022-08-16 NOTE — NUR
PATIENT HAS HAD AN UNEVENTFUL NIGHT AND IS RESTING IN BED WITH EYES CLOSED AT
THIS TIME.  PATIENT SHOWS NO SIGNS OR EVIDENCE OF ANY NEEDS OR ISSUES AT THIS
TIME.  CALL LIGHT IS WITHIN REACH OF PATIENT.

## 2022-08-16 NOTE — NUR
Pt noted blood to top part of pull-up brief. Small scratch noted to right
upper thigh, near crease of thigh and lower abd. Area cleaned and band aid
applied. Redness noted to right outer panus. Skin barrier ointment/moisture
barrier applied. Will continue to monitor

## 2022-08-16 NOTE — NUR
Follow-up visit; Maribell states she is doing well and thanks  for
looking in on her and keeping her in 's prayers.

## 2022-08-17 VITALS — SYSTOLIC BLOOD PRESSURE: 157 MMHG | DIASTOLIC BLOOD PRESSURE: 68 MMHG | HEART RATE: 66 BPM | TEMPERATURE: 97.9 F

## 2022-08-17 VITALS — HEART RATE: 63 BPM | DIASTOLIC BLOOD PRESSURE: 52 MMHG | SYSTOLIC BLOOD PRESSURE: 125 MMHG | TEMPERATURE: 97.9 F

## 2022-08-17 NOTE — NUR
met with patient to review and provide copy of team conference
notes. SW reviewed scheduled discharge date of Friday which patient is pleased
with. SW reviewed DME recommendations and advised that insurance typically
only covers either a wheelchair OR walker, and both are recommended for
patient. Patient would like to run insurance for the wheelchair and pay out of
pocket for the walker. Patient requested prices of shower chairs. SW will
follow up. SW also provided Medicare.gov list of  agencies for patient to
review.

## 2022-08-17 NOTE — NUR
Pt is back in room after PT. She remains in her wheelchair before next PT/OT
session. She reports minimal pain. Ace wrap to LLE is CDI. Bilat cam boots are
on. Call light is in her reach

## 2022-08-17 NOTE — NUR
BEGINNING OF SHIFT: PATIENT RESTING QUIETLY IN BED. PATIENT REPORTING PAIN AND
GIVEN PRN PAIN MEDICATION. PATIENT DENIES OTHER NEEDS OR CONCERNS AT THIS
TIME.

## 2022-08-17 NOTE — NUR
Pt assisted to BSC using FWW, gait belt. Bilateral cam boots on. Pt reporting
minimal pain. PRN pain medication was given with breakfast. Pt. assisted to
wheelchair after toileting for ADLs. She denies additional needs. Call light
is within her reach

## 2022-08-18 VITALS — TEMPERATURE: 98.1 F | SYSTOLIC BLOOD PRESSURE: 141 MMHG | DIASTOLIC BLOOD PRESSURE: 46 MMHG | HEART RATE: 67 BPM

## 2022-08-18 VITALS — HEART RATE: 72 BPM | DIASTOLIC BLOOD PRESSURE: 61 MMHG | SYSTOLIC BLOOD PRESSURE: 122 MMHG | TEMPERATURE: 98.1 F

## 2022-08-18 NOTE — NUR
MAURI. LAYING IN BED. WATCHING TV. DENIES NEED FOR PAIN MED. CAM BOOTS ON
BILAT. HAS GOOD SENSATION. DENIES NEEDS. CALL LIGHT IN REACH. PT MOD I IN ROOM
WITH WALKER/ WC.

## 2022-08-18 NOTE — NUR
ASSESSMENT DONE AS ORDER. PATIENT ALERT WITH NO MEMORY ISSUE. PATIENT GOING
HOME TOMORROW. PATIENT HAS NO PAIN AT THIS TIME. LUNG SOUND CLEAR. BOWEL SOUND
ACTIVE IN ALL 4 QUADS. PATIENT HAS BEEN EATING ABOUT 100% OF HER MEALS.PATIENT
HAS BEEN SMILE SINCE i WALK IN AND INFORM ME THAT SHE IS HAPPY TO GO HOME BUT
SAD THAT SHE IS NOT GOING TO SEE US AGAIN. SHE STATED THAT WE HELP HER ALOT
AND IS VERY GRATEFUL. [facility]
USE THE COMMODE AT NIGHT. CALL LIGHT IN REACH.

## 2022-08-18 NOTE — NUR
met with patient to review discharge plan as she will be
discharged home tomorrow. Patient advised she was able to secure a walker and
shower chair from a friend, so she only needs the wheelchair ordered at
[facility]. Patient also selected [facility]
from the Medicare.gov list of  agencies. SW presented IM form to patient who
verbalized understanding then provided signature. Form was placed in chart,
patient declined copy. BRENDA contacted [facility] and faxed order for wheelchair and
requested it be delivered prior to discharge tomorrow. BRENDA also contacted
Clarisa with [facility] and faxed referral. Clarisa advised they will be able to
accept patient.

## 2022-08-18 NOTE — NUR
END OF SHIFT NOTE: PATIENT RESTED QUIETLY THIS SHIFT. PATIENT HAD NO CONCERNS
OR COMPLAINTS. PATIENT GIVEN PRN LORTAB WITH AM MEDICATIONS AND PATIENT
REPORTED THAT PAIN WAS BETTER THAN EXPECTED AFTER NOT GETTING UP ALL NIGHT.
PATIENT UP TO BEDSIDE COMMODE WITH STAND BY ASSIST. PATIENT TRANSFERRED SELF
AND PERFORMED PIERRE CARE INDEPENDENTLY WITH SET UP HELP ONLY.

## 2022-08-19 VITALS — DIASTOLIC BLOOD PRESSURE: 62 MMHG | SYSTOLIC BLOOD PRESSURE: 132 MMHG | HEART RATE: 70 BPM | TEMPERATURE: 97.7 F

## 2022-08-19 NOTE — NUR
SEE MAR FOR NORCO GIVEN FOR BILAT ANKLE PAIN. LEVEL 4/10. PT REPORTS SLEPT OFF
AND ON THROUGH THE NIGHT.

## 2022-08-19 NOTE — NUR
contacted [facility] and confirmed they will deliver patient's
wheelchair this morning. BRENDA followed up with patient who advised she has been
in contact with [facility] and has no further questions at this time.
BRENDA faxed discharge orders to [facility].

## 2022-08-19 NOTE — NUR
ASSESSMENT DONE AS ORDER. PATIENT WILL BE DISCHARGE TODAY.PATIENT ALERT
AND ORIENTED X 4 WITH NO PAIN NOTED. HAD A PAIN MEDICATION AROUND 6 AM. LUNG
SOUND CLEAR IN ALL LOBES. BOWEL SOUND ACTIVE IN ALL 4 QUADS. PATINET ABLE TO
STAND AND PIVOT WTIH WALKER AND USE WHEELCHAIR FOR MOBILITY. PATIENT IS
EXCITED TO GO HOME. NO DISTRESS NOTED

## 2022-08-19 NOTE — NUR
Discharge QIM scores were reviewed by the team.  Code of 6 chosen for toilet
transfer was determined by team discussion to be the most usual performance
for this patient during the discharge assessment period.--SANTHOSH Obrien

## 2022-08-19 NOTE — NUR
DISCHARGE NOTED. PATIENT LEFT THE FACILITY AT 1248PM WITH ALL HER BELONGING.
PATIENT SIGN ALL DISCHARGE PAPERWORK AND WAS VERY THANKFUL FOR THE HELP.
PATIENT HAD NO CONCERN OR QUESTION REGRADING CARE. DRESS  ON HER LEFT FOOT WAS
CHANGE BEFORE SHE LEFT. PATIENT PAIN WAS AT A 0/10 ON BOTH ANKLES/LOWER
EXTREMITIES.WE WHEEL PATIENT DOWN IN HER PERSONAL WHEELCHAIR.